# Patient Record
Sex: FEMALE | Race: WHITE | NOT HISPANIC OR LATINO | URBAN - METROPOLITAN AREA
[De-identification: names, ages, dates, MRNs, and addresses within clinical notes are randomized per-mention and may not be internally consistent; named-entity substitution may affect disease eponyms.]

---

## 2017-08-15 ENCOUNTER — COMMUNICATION - HEALTHEAST (OUTPATIENT)
Dept: CARDIOLOGY | Facility: CLINIC | Age: 77
End: 2017-08-15

## 2017-08-15 ENCOUNTER — OFFICE VISIT - HEALTHEAST (OUTPATIENT)
Dept: CARDIOLOGY | Facility: CLINIC | Age: 77
End: 2017-08-15

## 2017-08-15 DIAGNOSIS — R00.2 HEART PALPITATIONS: ICD-10-CM

## 2017-08-15 DIAGNOSIS — I10 ESSENTIAL HYPERTENSION: ICD-10-CM

## 2017-08-15 DIAGNOSIS — R06.09 DYSPNEA ON EXERTION: ICD-10-CM

## 2017-08-15 DIAGNOSIS — E78.2 MIXED HYPERLIPIDEMIA: ICD-10-CM

## 2017-08-15 ASSESSMENT — MIFFLIN-ST. JEOR: SCORE: 1395.06

## 2017-08-16 ENCOUNTER — COMMUNICATION - HEALTHEAST (OUTPATIENT)
Dept: INTERNAL MEDICINE | Facility: CLINIC | Age: 77
End: 2017-08-16

## 2017-08-18 ENCOUNTER — OFFICE VISIT - HEALTHEAST (OUTPATIENT)
Dept: INTERNAL MEDICINE | Facility: CLINIC | Age: 77
End: 2017-08-18

## 2017-08-18 ENCOUNTER — COMMUNICATION - HEALTHEAST (OUTPATIENT)
Dept: INTERNAL MEDICINE | Facility: CLINIC | Age: 77
End: 2017-08-18

## 2017-08-18 DIAGNOSIS — E78.5 HYPERLIPEMIA: ICD-10-CM

## 2017-08-18 DIAGNOSIS — G45.9 TIA (TRANSIENT ISCHEMIC ATTACK): ICD-10-CM

## 2017-08-18 LAB
CHOLEST SERPL-MCNC: 133 MG/DL
FASTING STATUS PATIENT QL REPORTED: YES
HDLC SERPL-MCNC: 46 MG/DL
LDLC SERPL CALC-MCNC: 70 MG/DL
TRIGL SERPL-MCNC: 84 MG/DL

## 2017-08-18 ASSESSMENT — MIFFLIN-ST. JEOR: SCORE: 1397.55

## 2017-08-23 ENCOUNTER — HOSPITAL ENCOUNTER (OUTPATIENT)
Dept: CARDIOLOGY | Facility: CLINIC | Age: 77
Discharge: HOME | End: 2017-08-23
Attending: INTERNAL MEDICINE

## 2017-08-23 ENCOUNTER — HOSPITAL ENCOUNTER (OUTPATIENT)
Dept: ULTRASOUND IMAGING | Facility: CLINIC | Age: 77
Discharge: HOME | End: 2017-08-23
Attending: INTERNAL MEDICINE

## 2017-08-23 ENCOUNTER — COMMUNICATION - HEALTHEAST (OUTPATIENT)
Dept: INTERNAL MEDICINE | Facility: CLINIC | Age: 77
End: 2017-08-23

## 2017-08-23 ENCOUNTER — HOSPITAL ENCOUNTER (OUTPATIENT)
Dept: NUCLEAR MEDICINE | Facility: CLINIC | Age: 77
Discharge: HOME | End: 2017-08-23
Attending: INTERNAL MEDICINE

## 2017-08-23 DIAGNOSIS — R47.89 SPELLS OF SPEECH ARREST: ICD-10-CM

## 2017-08-23 DIAGNOSIS — R06.09 OTHER FORMS OF DYSPNEA: ICD-10-CM

## 2017-08-23 DIAGNOSIS — G45.9 TIA (TRANSIENT ISCHEMIC ATTACK): ICD-10-CM

## 2017-08-23 DIAGNOSIS — R06.09 DYSPNEA ON EXERTION: ICD-10-CM

## 2017-08-23 LAB
CV STRESS CURRENT BP HE: NORMAL
CV STRESS CURRENT HR HE: 100
CV STRESS CURRENT HR HE: 103
CV STRESS CURRENT HR HE: 104
CV STRESS CURRENT HR HE: 107
CV STRESS CURRENT HR HE: 108
CV STRESS CURRENT HR HE: 109
CV STRESS CURRENT HR HE: 110
CV STRESS CURRENT HR HE: 110
CV STRESS CURRENT HR HE: 112
CV STRESS CURRENT HR HE: 113
CV STRESS CURRENT HR HE: 114
CV STRESS CURRENT HR HE: 115
CV STRESS CURRENT HR HE: 115
CV STRESS CURRENT HR HE: 88
CV STRESS CURRENT HR HE: 93
CV STRESS CURRENT HR HE: 93
CV STRESS DEVIATION TIME HE: NORMAL
CV STRESS ECHO PERCENT HR HE: NORMAL
CV STRESS EXERCISE STAGE HE: NORMAL
CV STRESS FINAL RESTING BP HE: NORMAL
CV STRESS FINAL RESTING HR HE: 93
CV STRESS MAX HR HE: 116
CV STRESS MAX TREADMILL GRADE HE: 0
CV STRESS MAX TREADMILL SPEED HE: 1
CV STRESS PEAK DIA BP HE: NORMAL
CV STRESS PEAK SYS BP HE: NORMAL
CV STRESS PHASE HE: NORMAL
CV STRESS PROTOCOL HE: NORMAL
CV STRESS RESTING PT POSITION HE: NORMAL
CV STRESS RESTING PT POSITION HE: NORMAL
CV STRESS ST DEVIATION AMOUNT HE: NORMAL
CV STRESS ST DEVIATION ELEVATION HE: NORMAL
CV STRESS ST EVELATION AMOUNT HE: NORMAL
CV STRESS TEST TYPE HE: NORMAL
CV STRESS TOTAL STAGE TIME MIN 1 HE: NORMAL
NUC STRESS EJECTION FRACTION: 63 %
STRESS ECHO BASELINE BP: NORMAL
STRESS ECHO BASELINE HR: 98
STRESS ECHO CALCULATED PERCENT HR: 82 %
STRESS ECHO LAST STRESS BP: NORMAL
STRESS ECHO LAST STRESS HR: 112

## 2017-08-24 ENCOUNTER — COMMUNICATION - HEALTHEAST (OUTPATIENT)
Dept: ADMINISTRATIVE | Facility: CLINIC | Age: 77
End: 2017-08-24

## 2017-09-18 ENCOUNTER — OFFICE VISIT - HEALTHEAST (OUTPATIENT)
Dept: CARDIOLOGY | Facility: CLINIC | Age: 77
End: 2017-09-18

## 2017-09-18 DIAGNOSIS — I10 ESSENTIAL HYPERTENSION WITH GOAL BLOOD PRESSURE LESS THAN 140/90: ICD-10-CM

## 2017-09-18 DIAGNOSIS — E78.2 MIXED HYPERLIPIDEMIA: ICD-10-CM

## 2017-09-18 DIAGNOSIS — R00.2 PALPITATIONS: ICD-10-CM

## 2017-09-18 ASSESSMENT — MIFFLIN-ST. JEOR: SCORE: 1382.13

## 2017-10-23 ENCOUNTER — RECORDS - HEALTHEAST (OUTPATIENT)
Dept: ADMINISTRATIVE | Facility: OTHER | Age: 77
End: 2017-10-23

## 2017-10-24 ENCOUNTER — RECORDS - HEALTHEAST (OUTPATIENT)
Dept: ADMINISTRATIVE | Facility: OTHER | Age: 77
End: 2017-10-24

## 2017-11-24 ENCOUNTER — HOSPITAL ENCOUNTER (OUTPATIENT)
Dept: NEUROLOGY | Facility: HOSPITAL | Age: 77
Discharge: HOME | End: 2017-11-24
Attending: PSYCHIATRY & NEUROLOGY

## 2017-11-24 DIAGNOSIS — R47.01 APHASIA: ICD-10-CM

## 2017-11-24 DIAGNOSIS — G43.809 MIGRAINE VARIANT: ICD-10-CM

## 2017-12-05 ENCOUNTER — COMMUNICATION - HEALTHEAST (OUTPATIENT)
Dept: INTERNAL MEDICINE | Facility: CLINIC | Age: 77
End: 2017-12-05

## 2018-01-03 ENCOUNTER — RECORDS - HEALTHEAST (OUTPATIENT)
Dept: ADMINISTRATIVE | Facility: OTHER | Age: 78
End: 2018-01-03

## 2018-01-15 ENCOUNTER — COMMUNICATION - HEALTHEAST (OUTPATIENT)
Dept: INTERNAL MEDICINE | Facility: CLINIC | Age: 78
End: 2018-01-15

## 2018-01-15 DIAGNOSIS — Z00.00 HEALTH CARE MAINTENANCE: ICD-10-CM

## 2018-02-05 ENCOUNTER — COMMUNICATION - HEALTHEAST (OUTPATIENT)
Dept: INTERNAL MEDICINE | Facility: CLINIC | Age: 78
End: 2018-02-05

## 2018-03-25 ENCOUNTER — COMMUNICATION - HEALTHEAST (OUTPATIENT)
Dept: CARDIOLOGY | Facility: CLINIC | Age: 78
End: 2018-03-25

## 2018-07-16 ENCOUNTER — COMMUNICATION - HEALTHEAST (OUTPATIENT)
Dept: LAB | Facility: CLINIC | Age: 78
End: 2018-07-16

## 2018-07-16 DIAGNOSIS — E78.2 MIXED HYPERLIPIDEMIA: ICD-10-CM

## 2018-07-16 DIAGNOSIS — E55.9 VITAMIN D DEFICIENCY: ICD-10-CM

## 2018-07-17 ENCOUNTER — AMBULATORY - HEALTHEAST (OUTPATIENT)
Dept: INTERNAL MEDICINE | Facility: CLINIC | Age: 78
End: 2018-07-17

## 2018-07-17 DIAGNOSIS — E78.5 HYPERLIPEMIA: ICD-10-CM

## 2018-07-23 ENCOUNTER — AMBULATORY - HEALTHEAST (OUTPATIENT)
Dept: LAB | Facility: CLINIC | Age: 78
End: 2018-07-23

## 2018-07-23 DIAGNOSIS — E78.5 HYPERLIPEMIA: ICD-10-CM

## 2018-07-23 DIAGNOSIS — E55.9 VITAMIN D DEFICIENCY: ICD-10-CM

## 2018-07-23 DIAGNOSIS — E78.2 MIXED HYPERLIPIDEMIA: ICD-10-CM

## 2018-07-23 LAB
25(OH)D3 SERPL-MCNC: 30.3 NG/ML (ref 30–80)
ALBUMIN SERPL-MCNC: 3.9 G/DL (ref 3.5–5)
ALBUMIN UR-MCNC: NEGATIVE MG/DL
ALP SERPL-CCNC: 94 U/L (ref 45–120)
ALT SERPL W P-5'-P-CCNC: 27 U/L (ref 0–45)
ANION GAP SERPL CALCULATED.3IONS-SCNC: 9 MMOL/L (ref 5–18)
APPEARANCE UR: CLEAR
AST SERPL W P-5'-P-CCNC: 22 U/L (ref 0–40)
BILIRUB SERPL-MCNC: 0.9 MG/DL (ref 0–1)
BILIRUB UR QL STRIP: NEGATIVE
BUN SERPL-MCNC: 15 MG/DL (ref 8–28)
CALCIUM SERPL-MCNC: 9.4 MG/DL (ref 8.5–10.5)
CHLORIDE BLD-SCNC: 106 MMOL/L (ref 98–107)
CHOLEST SERPL-MCNC: 138 MG/DL
CO2 SERPL-SCNC: 29 MMOL/L (ref 22–31)
COLOR UR AUTO: YELLOW
CREAT SERPL-MCNC: 0.71 MG/DL (ref 0.6–1.1)
ERYTHROCYTE [DISTWIDTH] IN BLOOD BY AUTOMATED COUNT: 10.9 % (ref 11–14.5)
FASTING STATUS PATIENT QL REPORTED: YES
GFR SERPL CREATININE-BSD FRML MDRD: >60 ML/MIN/1.73M2
GLUCOSE BLD-MCNC: 103 MG/DL (ref 70–125)
GLUCOSE UR STRIP-MCNC: NEGATIVE MG/DL
HCT VFR BLD AUTO: 43.7 % (ref 35–47)
HDLC SERPL-MCNC: 53 MG/DL
HGB BLD-MCNC: 15 G/DL (ref 12–16)
HGB UR QL STRIP: NEGATIVE
KETONES UR STRIP-MCNC: NEGATIVE MG/DL
LDLC SERPL CALC-MCNC: 67 MG/DL
LEUKOCYTE ESTERASE UR QL STRIP: ABNORMAL
MCH RBC QN AUTO: 31.7 PG (ref 27–34)
MCHC RBC AUTO-ENTMCNC: 34.2 G/DL (ref 32–36)
MCV RBC AUTO: 93 FL (ref 80–100)
NITRATE UR QL: NEGATIVE
PH UR STRIP: 6 [PH] (ref 5–8)
PLATELET # BLD AUTO: 202 THOU/UL (ref 140–440)
PMV BLD AUTO: 6.9 FL (ref 7–10)
POTASSIUM BLD-SCNC: 3.9 MMOL/L (ref 3.5–5)
PROT SERPL-MCNC: 6.6 G/DL (ref 6–8)
RBC # BLD AUTO: 4.72 MILL/UL (ref 3.8–5.4)
SODIUM SERPL-SCNC: 144 MMOL/L (ref 136–145)
SP GR UR STRIP: 1.01 (ref 1–1.03)
TRIGL SERPL-MCNC: 91 MG/DL
TSH SERPL DL<=0.005 MIU/L-ACNC: 0.14 UIU/ML (ref 0.3–5)
UROBILINOGEN UR STRIP-ACNC: ABNORMAL
WBC: 4.5 THOU/UL (ref 4–11)

## 2018-07-24 LAB — BACTERIA SPEC CULT: NO GROWTH

## 2018-07-26 ENCOUNTER — COMMUNICATION - HEALTHEAST (OUTPATIENT)
Dept: ADMINISTRATIVE | Facility: CLINIC | Age: 78
End: 2018-07-26

## 2018-07-27 ENCOUNTER — OFFICE VISIT - HEALTHEAST (OUTPATIENT)
Dept: INTERNAL MEDICINE | Facility: CLINIC | Age: 78
End: 2018-07-27

## 2018-07-27 DIAGNOSIS — L98.9 SKIN LESION: ICD-10-CM

## 2018-07-27 DIAGNOSIS — G45.9 TIA (TRANSIENT ISCHEMIC ATTACK): ICD-10-CM

## 2018-07-27 DIAGNOSIS — E78.5 HYPERLIPEMIA: ICD-10-CM

## 2018-07-27 DIAGNOSIS — I10 ESSENTIAL HYPERTENSION: ICD-10-CM

## 2018-07-27 DIAGNOSIS — R00.2 HEART PALPITATIONS: ICD-10-CM

## 2018-09-13 ENCOUNTER — COMMUNICATION - HEALTHEAST (OUTPATIENT)
Dept: ADMINISTRATIVE | Facility: CLINIC | Age: 78
End: 2018-09-13

## 2018-11-07 ENCOUNTER — COMMUNICATION - HEALTHEAST (OUTPATIENT)
Dept: INTERNAL MEDICINE | Facility: CLINIC | Age: 78
End: 2018-11-07

## 2018-11-20 ENCOUNTER — COMMUNICATION - HEALTHEAST (OUTPATIENT)
Dept: INTERNAL MEDICINE | Facility: CLINIC | Age: 78
End: 2018-11-20

## 2019-01-22 ENCOUNTER — COMMUNICATION - HEALTHEAST (OUTPATIENT)
Dept: INTERNAL MEDICINE | Facility: CLINIC | Age: 79
End: 2019-01-22

## 2019-01-22 DIAGNOSIS — E03.9 HYPOTHYROIDISM: ICD-10-CM

## 2019-01-22 DIAGNOSIS — G45.9 TIA (TRANSIENT ISCHEMIC ATTACK): ICD-10-CM

## 2019-01-22 DIAGNOSIS — E78.2 MIXED HYPERLIPIDEMIA: ICD-10-CM

## 2019-05-13 ENCOUNTER — COMMUNICATION - HEALTHEAST (OUTPATIENT)
Dept: INTERNAL MEDICINE | Facility: CLINIC | Age: 79
End: 2019-05-13

## 2019-06-10 ENCOUNTER — OFFICE VISIT - HEALTHEAST (OUTPATIENT)
Dept: INTERNAL MEDICINE | Facility: CLINIC | Age: 79
End: 2019-06-10

## 2019-06-10 DIAGNOSIS — Z00.00 ROUTINE GENERAL MEDICAL EXAMINATION AT A HEALTH CARE FACILITY: ICD-10-CM

## 2019-06-10 DIAGNOSIS — Z13.220 ENCOUNTER FOR SCREENING FOR LIPOID DISORDERS: ICD-10-CM

## 2019-06-10 DIAGNOSIS — E03.9 HYPOTHYROIDISM, UNSPECIFIED TYPE: ICD-10-CM

## 2019-06-10 DIAGNOSIS — I10 ESSENTIAL HYPERTENSION: ICD-10-CM

## 2019-06-10 DIAGNOSIS — Z13.1 ENCOUNTER FOR SCREENING FOR DIABETES MELLITUS: ICD-10-CM

## 2019-06-10 DIAGNOSIS — M89.9 DISORDER OF BONE AND CARTILAGE: ICD-10-CM

## 2019-06-10 DIAGNOSIS — M94.9 DISORDER OF BONE AND CARTILAGE: ICD-10-CM

## 2019-06-10 LAB
ANION GAP SERPL CALCULATED.3IONS-SCNC: 9 MMOL/L (ref 5–18)
BUN SERPL-MCNC: 13 MG/DL (ref 8–28)
CALCIUM SERPL-MCNC: 9.7 MG/DL (ref 8.5–10.5)
CHLORIDE BLD-SCNC: 104 MMOL/L (ref 98–107)
CHOLEST SERPL-MCNC: 122 MG/DL
CO2 SERPL-SCNC: 28 MMOL/L (ref 22–31)
CREAT SERPL-MCNC: 0.68 MG/DL (ref 0.6–1.1)
FASTING STATUS PATIENT QL REPORTED: ABNORMAL
GFR SERPL CREATININE-BSD FRML MDRD: >60 ML/MIN/1.73M2
GLUCOSE BLD-MCNC: 106 MG/DL (ref 70–125)
HDLC SERPL-MCNC: 41 MG/DL
LDLC SERPL CALC-MCNC: 61 MG/DL
POTASSIUM BLD-SCNC: 3.9 MMOL/L (ref 3.5–5)
SODIUM SERPL-SCNC: 141 MMOL/L (ref 136–145)
TRIGL SERPL-MCNC: 98 MG/DL
TSH SERPL DL<=0.005 MIU/L-ACNC: 0.03 UIU/ML (ref 0.3–5)

## 2019-06-10 ASSESSMENT — MIFFLIN-ST. JEOR: SCORE: 1304.11

## 2019-06-11 LAB — 25(OH)D3 SERPL-MCNC: 33.3 NG/ML (ref 30–80)

## 2019-06-12 ENCOUNTER — COMMUNICATION - HEALTHEAST (OUTPATIENT)
Dept: INTERNAL MEDICINE | Facility: CLINIC | Age: 79
End: 2019-06-12

## 2019-06-12 ENCOUNTER — AMBULATORY - HEALTHEAST (OUTPATIENT)
Dept: INTERNAL MEDICINE | Facility: CLINIC | Age: 79
End: 2019-06-12

## 2019-06-12 DIAGNOSIS — E03.9 HYPOTHYROIDISM: ICD-10-CM

## 2019-07-16 ENCOUNTER — COMMUNICATION - HEALTHEAST (OUTPATIENT)
Dept: INTERNAL MEDICINE | Facility: CLINIC | Age: 79
End: 2019-07-16

## 2019-07-16 DIAGNOSIS — E78.2 MIXED HYPERLIPIDEMIA: ICD-10-CM

## 2019-07-30 ENCOUNTER — COMMUNICATION - HEALTHEAST (OUTPATIENT)
Dept: INTERNAL MEDICINE | Facility: CLINIC | Age: 79
End: 2019-07-30

## 2019-07-30 DIAGNOSIS — E03.9 HYPOTHYROIDISM: ICD-10-CM

## 2019-10-02 ENCOUNTER — COMMUNICATION - HEALTHEAST (OUTPATIENT)
Dept: INTERNAL MEDICINE | Facility: CLINIC | Age: 79
End: 2019-10-02

## 2019-10-02 DIAGNOSIS — G45.9 TIA (TRANSIENT ISCHEMIC ATTACK): ICD-10-CM

## 2019-10-03 RX ORDER — LOSARTAN POTASSIUM 100 MG/1
TABLET ORAL
Qty: 90 TABLET | Refills: 2 | Status: SHIPPED | OUTPATIENT
Start: 2019-10-03

## 2019-10-03 RX ORDER — HYDROCHLOROTHIAZIDE 12.5 MG/1
TABLET ORAL
Qty: 90 TABLET | Refills: 2 | Status: SHIPPED | OUTPATIENT
Start: 2019-10-03

## 2020-05-05 ENCOUNTER — COMMUNICATION - HEALTHEAST (OUTPATIENT)
Dept: INTERNAL MEDICINE | Facility: CLINIC | Age: 80
End: 2020-05-05

## 2020-05-05 DIAGNOSIS — E03.9 HYPOTHYROIDISM: ICD-10-CM

## 2020-05-07 RX ORDER — LEVOTHYROXINE SODIUM 125 UG/1
TABLET ORAL
Qty: 90 TABLET | Refills: 0 | Status: SHIPPED | OUTPATIENT
Start: 2020-05-07

## 2020-05-21 ENCOUNTER — COMMUNICATION - HEALTHEAST (OUTPATIENT)
Dept: INTERNAL MEDICINE | Facility: CLINIC | Age: 80
End: 2020-05-21

## 2020-05-21 DIAGNOSIS — E78.2 MIXED HYPERLIPIDEMIA: ICD-10-CM

## 2020-05-26 RX ORDER — ATORVASTATIN CALCIUM 20 MG/1
TABLET, FILM COATED ORAL
Qty: 90 TABLET | Refills: 0 | Status: SHIPPED | OUTPATIENT
Start: 2020-05-26

## 2020-06-27 ENCOUNTER — COMMUNICATION - HEALTHEAST (OUTPATIENT)
Dept: INTERNAL MEDICINE | Facility: CLINIC | Age: 80
End: 2020-06-27

## 2020-06-27 DIAGNOSIS — E03.9 HYPOTHYROIDISM: ICD-10-CM

## 2021-05-25 ENCOUNTER — RECORDS - HEALTHEAST (OUTPATIENT)
Dept: ADMINISTRATIVE | Facility: CLINIC | Age: 81
End: 2021-05-25

## 2021-05-26 ENCOUNTER — RECORDS - HEALTHEAST (OUTPATIENT)
Dept: ADMINISTRATIVE | Facility: CLINIC | Age: 81
End: 2021-05-26

## 2021-05-29 NOTE — TELEPHONE ENCOUNTER
----- Message from Renata Easley MD sent at 6/12/2019 11:06 AM CDT -----  Please call patient and let her know:    1) Her vitamin D level is great. Continue vitamin D as she is taking.  2) Her electrolytes and kidney function are great.  3) Fasting glucose is great at 106.  4) Her total cholesterol is 122 and bad cholesterol is 61, which is great. Her good cholesterol HDL is low at 41. Increasing physical activity will help bring that number up.  5) Her thyroid testing shows that her dose of synthroid is a little too high. I want her to decrease her synthroid from 150mcg to 125 mcg daily and repeat the TSH test in 4-6 weeks (she can just go to lab for lab only visit). I will send new Rx for synthroid.    Dr. Lopez

## 2021-05-29 NOTE — PROGRESS NOTES
Assessment and Plan:       1. Routine general medical examination at a health care facility  --She wishes to defer TD booster for now and get it next fall with influenza vaccine  --Discussed Shingrix with patient. She can go to [facility] pharmacy to inquire about whether they are still offering.    2. Encounter for screening for diabetes mellitus  --fasting glucose    3. Encounter for screening for lipoid disorders  - Lipid Cavalier, FASTING; Future  - Lipid Cavalier, FASTING  - continue atorvastatin    4. Essential hypertension, well controlled  - Basic Metabolic Panel  - continue HCTZ and losartan    5. Hypothyroidism, unspecified type  - Thyroid Stimulating Hormone (TSH)  - continue synthroid    6. Osteopenia  Takes vitamin D.  - Vitamin D, Total (25-Hydroxy)     The patient's current medical problems were reviewed.    I have had an Advance Directives discussion with the patient.  The following health maintenance schedule was reviewed with the patient and provided in printed form in the after visit summary:   Health Maintenance   Topic Date Due     TD 18+ HE  12/17/1958     PNEUMOCOCCAL CONJUGATE VACCINE FOR ADULTS (PCV13 OR PREVNAR)  12/17/2003     ZOSTER VACCINES (2 of 3) 03/12/2007     FALL RISK ASSESSMENT  07/27/2019     INFLUENZA VACCINE RULE BASED (Season Ended) 08/01/2019     ADVANCE DIRECTIVES DISCUSSED WITH PATIENT  08/18/2022     PNEUMOCOCCAL POLYSACCHARIDE VACCINE AGE 65 AND OVER  Completed     DXA SCAN  Discontinued        Subjective:   Chief Complaint: Shira Hubbard is an 78 y.o. female here for an Annual Wellness visit.     HPI:      1. Hypothyroidism  Taking her synthroid everyday.    2. HLD  Takes her atorvastatin daily.    3. HTN  Takes losartan and HCTZ. Tolerates well.     4. Macular degeneration  Diagnosed last week with macular degeneration.    5. Chronic back pain  She just wants to let me know she has chronic back pain. Uses bands to help stretch her back.    6. Advanced  directive  Desires no CPR/no intubation    Review of Systems:  Please see above.  The rest of the review of systems are negative for all systems.    Patient Care Team:  Renata Easley MD as PCP - General (Internal Medicine)     Patient Active Problem List   Diagnosis     Irritable Bowel Syndrome     Hypothyroidism     Mixed hyperlipidemia     Essential Hypertension     Collagenous Colitis     Osteopenia     Heart palpitations     TIA (transient ischemic attack)     Past Medical History:   Diagnosis Date     Cancer (H) 1974    Breast cancer bilateral mastectomies     Cerebral vascular accident (H)      Family history of myocardial infarction      Hypertension       Past Surgical History:   Procedure Laterality Date     BREAST SURGERY Bilateral 1974    Bilateral mastectomy     ND MASTECTOMY, RADICAL Bilateral 1974    Remote breast cancer     TENDON REPAIR Left 1990      Family History   Problem Relation Age of Onset     Coronary artery disease Mother      Sudden death Mother      Coronary artery disease Father      CABG Father      Coronary artery disease Brother      CABG Brother       Social History     Socioeconomic History     Marital status:      Spouse name: Eric     Number of children: Not on file     Years of education: Not on file     Highest education level: Not on file   Occupational History     Occupation: Retired   Social Needs     Financial resource strain: Not on file     Food insecurity:     Worry: Not on file     Inability: Not on file     Transportation needs:     Medical: Not on file     Non-medical: Not on file   Tobacco Use     Smoking status: Former Smoker     Types: Cigarettes     Smokeless tobacco: Former User   Substance and Sexual Activity     Alcohol use: Not on file     Drug use: Not on file     Sexual activity: Not on file   Lifestyle     Physical activity:     Days per week: Not on file     Minutes per session: Not on file     Stress: Not on file   Relationships      "Social connections:     Talks on phone: Not on file     Gets together: Not on file     Attends Judaism service: Not on file     Active member of club or organization: Not on file     Attends meetings of clubs or organizations: Not on file     Relationship status: Not on file     Intimate partner violence:     Fear of current or ex partner: Not on file     Emotionally abused: Not on file     Physically abused: Not on file     Forced sexual activity: Not on file   Other Topics Concern     Not on file   Social History Narrative     50+ years 3 grown children to live in [place] area son lives in [place].  Her  Eric is a retired [employer] executive.  The spend winter months in [place] and [place] in [place].  Non-smoker; no alcohol issues.      Current Outpatient Medications   Medication Sig Dispense Refill     aspirin 81 MG EC tablet Take 81 mg by mouth at bedtime.       atorvastatin (LIPITOR) 20 MG tablet TAKE 1 TABLET AT BEDTIME 90 tablet 1     cholecalciferol, vitamin D3, (VITAMIN D3) 2,000 unit Tab Take 2,000 Units by mouth daily.       hydroCHLOROthiazide (HYDRODIURIL) 12.5 MG tablet TAKE 1 TABLET EVERY DAY 90 tablet 1     levothyroxine (SYNTHROID, LEVOTHROID) 150 MCG tablet TAKE 1 TABLET EVERY DAY 90 tablet 1     losartan (COZAAR) 100 MG tablet One tablet (100 mg) daily for BP. 90 tablet 3     pantoprazole (PROTONIX) 20 MG tablet Take 20 mg by mouth 2 (two) times a day.       No current facility-administered medications for this visit.       Objective:   Vital Signs:   Visit Vitals  /60 (Patient Site: Right Arm, Patient Position: Sitting, Cuff Size: Adult Regular)   Pulse 64   Resp 16   Ht 5' 4\" (1.626 m)   Wt 189 lb 6.4 oz (85.9 kg)   SpO2 97%   BMI 32.51 kg/m         VisionScreening:  No exam data present     PHYSICAL EXAM  /60 (Patient Site: Right Arm, Patient Position: Sitting, Cuff Size: Adult Regular)   Pulse 64   " "Resp 16   Ht 5' 4\" (1.626 m)   Wt 189 lb 6.4 oz (85.9 kg)   SpO2 97%   BMI 32.51 kg/m      General Appearance:    Alert, cooperative, no distress, appears stated age   Head:    Normocephalic, without obvious abnormality, atraumatic   Eyes:    PERRL, conjunctiva/corneas clear, EOM's intact   Ears:    Normal TM's and external ear canals, both ears   Nose:   Nares normal, septum midline, mucosa normal   Throat:   Lips, mucosa, and tongue normal; teeth and gums normal   Neck:   Supple, symmetrical, trachea midline, no adenopathy   Lungs:     Clear to auscultation bilaterally, respirations unlabored    Heart:    Regular rate and rhythm, S1 and S2 normal, no murmur, rub    or gallop   Breast Exam:    Deferred   Abdomen:     Soft, non-tender, bowel sounds active   Extremities:   Extremities normal, atraumatic, no cyanosis or edema   Pulses:   2+ and symmetric all extremities   Skin:   Skin color, texture, turgor normal, no rashes or lesions   Neurologic:   Normal strength and tone throughout, gait normal       Assessment Results 6/10/2019   Activities of Daily Living No help needed   Instrumental Activities of Daily Living No help needed   Get Up and Go Score Less than 12 seconds   Mini Cog Total Score 5   Some recent data might be hidden     A Mini-Cog score of 0-2 suggests the possibility of dementia, score of 3-5 suggests no dementia    Identified Health Risks:     She is at risk for lack of exercise and has been provided with information to increase physical activity for the benefit of her well-being.  The patient reports that she does not have all recommended working emergency equipment available. She was provided with information about emergency preparedness, including smoke detectors.  Information on urinary incontinence and treatment options given to patient.  She is at risk for falling and has been provided with information to reduce the risk of falling at home.  Patient's advanced directive was discussed and " I am comfortable with the patient's wishes.

## 2021-05-29 NOTE — TELEPHONE ENCOUNTER
Message is left for patient to phone clinic for lab results. Please relay the followin) Her vitamin D level is great. Continue vitamin D as she is taking.   2) Her electrolytes and kidney function are great.   3) Fasting glucose is great at 106.   4) Her total cholesterol is 122 and bad cholesterol is 61, which is great. Her good cholesterol HDL is low at 41. Increasing physical activity will help bring that number up.   5) Her thyroid testing shows that her dose of synthroid is a little too high. I want her to decrease her synthroid from 150mcg to 125 mcg daily and repeat the TSH test in 4-6 weeks (she can just go to lab for lab only visit). I will send new Rx for synthroid.     Dr. Lopez

## 2021-05-30 NOTE — TELEPHONE ENCOUNTER
Refill Approved    Rx renewed per Medication Renewal Policy. Medication was last renewed on 1/24/19.    Lois Walton, [facility] Triage/Med Refill 7/16/2019     Requested Prescriptions   Pending Prescriptions Disp Refills     atorvastatin (LIPITOR) 20 MG tablet [Pharmacy Med Name: ATORVASTATIN CALCIUM 20 MG Tablet] 90 tablet 1     Sig: TAKE 1 TABLET AT BEDTIME       Statins Refill Protocol (Hmg CoA Reductase Inhibitors) Passed - 7/16/2019  3:24 PM        Passed - PCP or prescribing provider visit in past 12 months      Last office visit with prescriber/PCP: 7/27/2018 Jan Blackburn MD OR same dept: 7/27/2018 Jan Blackburn MD OR same specialty: 7/27/2018 Jan Blackburn MD  Last physical: Visit date not found Last MTM visit: Visit date not found   Next visit within 3 mo: Visit date not found  Next physical within 3 mo: Visit date not found  Prescriber OR PCP: Jan Blackburn MD  Last diagnosis associated with med order: 1. Mixed hyperlipidemia  - atorvastatin (LIPITOR) 20 MG tablet [Pharmacy Med Name: ATORVASTATIN CALCIUM 20 MG Tablet]; TAKE 1 TABLET AT BEDTIME  Dispense: 90 tablet; Refill: 1    If protocol passes may refill for 12 months if within 3 months of last provider visit (or a total of 15 months).

## 2021-05-30 NOTE — TELEPHONE ENCOUNTER
Refill Approved    Rx renewed per Medication Renewal Policy. Medication was last renewed on 6/12/19.    Lois Walton, [facility] Connection Triage/Med Refill 7/30/2019     Requested Prescriptions   Pending Prescriptions Disp Refills     levothyroxine (SYNTHROID, LEVOTHROID) 125 MCG tablet [Pharmacy Med Name: LEVOTHYROXINE SODIUM 125 MCG Tablet] 60 tablet 1     Sig: TAKE 1 TABLET EVERY DAY       Thyroid Hormones Protocol Passed - 7/30/2019  1:46 PM        Passed - Provider visit in past 12 months or next 3 months     Last office visit with prescriber/PCP: Visit date not found OR same dept: Visit date not found OR same specialty: 7/27/2018 Jan Blackburn MD  Last physical: 6/10/2019 Last MTM visit: Visit date not found   Next visit within 3 mo: Visit date not found  Next physical within 3 mo: Visit date not found  Prescriber OR PCP: Renata Easley MD  Last diagnosis associated with med order: 1. Hypothyroidism  - levothyroxine (SYNTHROID, LEVOTHROID) 125 MCG tablet [Pharmacy Med Name: LEVOTHYROXINE SODIUM 125 MCG Tablet]; TAKE 1 TABLET EVERY DAY  Dispense: 60 tablet; Refill: 1    If protocol passes may refill for 12 months if within 3 months of last provider visit (or a total of 15 months).             Passed - TSH on file in past 12 months for patient age 12 & older     TSH   Date Value Ref Range Status   06/10/2019 0.03 (L) 0.30 - 5.00 uIU/mL Final

## 2021-05-31 VITALS — WEIGHT: 206.6 LBS | BODY MASS INDEX: 35.27 KG/M2 | HEIGHT: 64 IN

## 2021-05-31 VITALS — HEIGHT: 65 IN | BODY MASS INDEX: 34.6 KG/M2 | WEIGHT: 207.7 LBS

## 2021-05-31 VITALS — WEIGHT: 210 LBS | BODY MASS INDEX: 35.85 KG/M2 | HEIGHT: 64 IN

## 2021-06-01 VITALS — WEIGHT: 204.4 LBS | BODY MASS INDEX: 35.09 KG/M2

## 2021-06-02 ENCOUNTER — RECORDS - HEALTHEAST (OUTPATIENT)
Dept: ADMINISTRATIVE | Facility: CLINIC | Age: 81
End: 2021-06-02

## 2021-06-02 NOTE — TELEPHONE ENCOUNTER
Refill Approved    Rx renewed per Medication Renewal Policy. Medication was last renewed on 1/24/19.11/20/18    Lois Walton, [facility] Connection Triage/Med Refill 10/3/2019     Requested Prescriptions   Pending Prescriptions Disp Refills     hydroCHLOROthiazide (HYDRODIURIL) 12.5 MG tablet [Pharmacy Med Name: HYDROCHLOROTHIAZIDE 12.5 MG Tablet] 90 tablet 1     Sig: TAKE 1 TABLET EVERY DAY       Diuretics/Combination Diuretics Refill Protocol  Passed - 10/2/2019  5:29 PM        Passed - Visit with PCP or prescribing provider visit in past 12 months     Last office visit with prescriber/PCP: 7/27/2018 Jan Blackburn MD OR same dept: Visit date not found OR same specialty: 7/27/2018 Jan Blackburn MD  Last physical: Visit date not found Last MTM visit: Visit date not found   Next visit within 3 mo: Visit date not found  Next physical within 3 mo: Visit date not found  Prescriber OR PCP: Jan Blackburn MD  Last diagnosis associated with med order: 1. TIA (transient ischemic attack)  - hydroCHLOROthiazide (HYDRODIURIL) 12.5 MG tablet [Pharmacy Med Name: HYDROCHLOROTHIAZIDE 12.5 MG Tablet]; TAKE 1 TABLET EVERY DAY  Dispense: 90 tablet; Refill: 1    If protocol passes may refill for 12 months if within 3 months of last provider visit (or a total of 15 months).             Passed - Serum Potassium in past 12 months      Lab Results   Component Value Date    Potassium 3.9 06/10/2019             Passed - Serum Sodium in past 12 months      Lab Results   Component Value Date    Sodium 141 06/10/2019             Passed - Blood pressure on file in past 12 months     BP Readings from Last 1 Encounters:   06/10/19 118/60             Passed - Serum Creatinine in past 12 months      Creatinine   Date Value Ref Range Status   06/10/2019 0.68 0.60 - 1.10 mg/dL Final             losartan (COZAAR) 100 MG tablet [Pharmacy Med Name: LOSARTAN POTASSIUM 100 MG Tablet] 90 tablet 3     Sig: TAKE 1 TABLET EVERY DAY FOR BLOOD  PRESSURE       Angiotensin Receptor Blocker Protocol Passed - 10/2/2019  5:29 PM        Passed - PCP or prescribing provider visit in past 12 months       Last office visit with prescriber/PCP: 7/27/2018 Jan Blackburn MD OR same dept: Visit date not found OR same specialty: 7/27/2018 Jan Blackburn MD  Last physical: Visit date not found Last MTM visit: Visit date not found   Next visit within 3 mo: Visit date not found  Next physical within 3 mo: Visit date not found  Prescriber OR PCP: Jan Blackburn MD  Last diagnosis associated with med order: 1. TIA (transient ischemic attack)  - hydroCHLOROthiazide (HYDRODIURIL) 12.5 MG tablet [Pharmacy Med Name: HYDROCHLOROTHIAZIDE 12.5 MG Tablet]; TAKE 1 TABLET EVERY DAY  Dispense: 90 tablet; Refill: 1    If protocol passes may refill for 12 months if within 3 months of last provider visit (or a total of 15 months).             Passed - Serum potassium within the past 12 months     Lab Results   Component Value Date    Potassium 3.9 06/10/2019             Passed - Blood pressure filed in past 12 months     BP Readings from Last 1 Encounters:   06/10/19 118/60             Passed - Serum creatinine within the past 12 months     Creatinine   Date Value Ref Range Status   06/10/2019 0.68 0.60 - 1.10 mg/dL Final

## 2021-06-03 VITALS — HEIGHT: 64 IN | WEIGHT: 189.4 LBS | BODY MASS INDEX: 32.33 KG/M2

## 2021-06-08 NOTE — TELEPHONE ENCOUNTER
Refill Approved    Rx renewed per Medication Renewal Policy. Medication was last renewed on 7/30/19.    Lois Walton, [facility] Connection Triage/Med Refill 5/7/2020     Requested Prescriptions   Pending Prescriptions Disp Refills     levothyroxine (SYNTHROID, LEVOTHROID) 125 MCG tablet [Pharmacy Med Name: LEVOTHYROXINE SODIUM 125 MCG Tablet] 90 tablet 2     Sig: TAKE 1 TABLET EVERY DAY       Thyroid Hormones Protocol Passed - 5/5/2020  3:30 PM        Passed - Provider visit in past 12 months or next 3 months     Last office visit with prescriber/PCP: Visit date not found OR same dept: Visit date not found OR same specialty: 7/27/2018 Jan Blackburn MD  Last physical: 6/10/2019 Last MTM visit: Visit date not found   Next visit within 3 mo: Visit date not found  Next physical within 3 mo: Visit date not found  Prescriber OR PCP: Renata Easley MD  Last diagnosis associated with med order: 1. Hypothyroidism  - levothyroxine (SYNTHROID, LEVOTHROID) 125 MCG tablet [Pharmacy Med Name: LEVOTHYROXINE SODIUM 125 MCG Tablet]; TAKE 1 TABLET EVERY DAY  Dispense: 90 tablet; Refill: 2    If protocol passes may refill for 12 months if within 3 months of last provider visit (or a total of 15 months).             Passed - TSH on file in past 12 months for patient age 12 & older     TSH   Date Value Ref Range Status   06/10/2019 0.03 (L) 0.30 - 5.00 uIU/mL Final

## 2021-06-08 NOTE — TELEPHONE ENCOUNTER
Refill Approved    Rx renewed per Medication Renewal Policy. Medication was last renewed on 7/16/19.    Lois Walton, [facility] Connection Triage/Med Refill 5/26/2020     Requested Prescriptions   Pending Prescriptions Disp Refills     atorvastatin (LIPITOR) 20 MG tablet [Pharmacy Med Name: ATORVASTATIN CALCIUM 20 MG Tablet] 90 tablet 3     Sig: TAKE 1 TABLET AT BEDTIME       Statins Refill Protocol (Hmg CoA Reductase Inhibitors) Passed - 5/21/2020  6:19 PM        Passed - PCP or prescribing provider visit in past 12 months      Last office visit with prescriber/PCP: Visit date not found OR same dept: Visit date not found OR same specialty: 7/27/2018 Jan Blackburn MD  Last physical: 6/10/2019 Last MTM visit: Visit date not found   Next visit within 3 mo: Visit date not found  Next physical within 3 mo: Visit date not found  Prescriber OR PCP: Renata Easley MD  Last diagnosis associated with med order: 1. Mixed hyperlipidemia  - atorvastatin (LIPITOR) 20 MG tablet [Pharmacy Med Name: ATORVASTATIN CALCIUM 20 MG Tablet]; TAKE 1 TABLET AT BEDTIME  Dispense: 90 tablet; Refill: 3    If protocol passes may refill for 12 months if within 3 months of last provider visit (or a total of 15 months).

## 2021-06-09 NOTE — TELEPHONE ENCOUNTER
RN cannot approve Refill Request    RN can NOT refill this medication PCP messaged that patient is overdue for Labs and Office Visit. Last office visit: Visit date not found Last Physical: 6/10/2019 Last MTM visit: Visit date not found Last visit same specialty: 7/27/2018 Jan Blackburn MD.  Next visit within 3 mo: Visit date not found  Next physical within 3 mo: Visit date not found      Jazmin Valdivia, [facility] Triage/Med Refill 6/28/2020    Requested Prescriptions   Pending Prescriptions Disp Refills     levothyroxine (SYNTHROID, LEVOTHROID) 125 MCG tablet [Pharmacy Med Name: LEVOTHYROXINE SODIUM 125 MCG Tablet] 90 tablet 0     Sig: TAKE 1 TABLET EVERY DAY       Thyroid Hormones Protocol Failed - 6/27/2020  2:12 PM        Failed - Provider visit in past 12 months or next 3 months     Last office visit with prescriber/PCP: Visit date not found OR same dept: Visit date not found OR same specialty: 7/27/2018 Jan Blackburn MD  Last physical: 6/10/2019 Last MTM visit: Visit date not found   Next visit within 3 mo: Visit date not found  Next physical within 3 mo: Visit date not found  Prescriber OR PCP: Renata Easley MD  Last diagnosis associated with med order: 1. Hypothyroidism  - levothyroxine (SYNTHROID, LEVOTHROID) 125 MCG tablet [Pharmacy Med Name: LEVOTHYROXINE SODIUM 125 MCG Tablet]; TAKE 1 TABLET EVERY DAY  Dispense: 90 tablet; Refill: 0    If protocol passes may refill for 12 months if within 3 months of last provider visit (or a total of 15 months).             Failed - TSH on file in past 12 months for patient age 12 & older     TSH   Date Value Ref Range Status   06/10/2019 0.03 (L) 0.30 - 5.00 uIU/mL Final

## 2021-06-09 NOTE — TELEPHONE ENCOUNTER
Please call patient and let her know she is due for med check. Declining refill on synthroid until she has an appointment.

## 2021-06-16 PROBLEM — R00.2 HEART PALPITATIONS: Status: ACTIVE | Noted: 2017-08-15

## 2021-06-16 PROBLEM — G45.9 TIA (TRANSIENT ISCHEMIC ATTACK): Status: ACTIVE | Noted: 2017-08-18

## 2021-06-17 NOTE — PATIENT INSTRUCTIONS - HE
Patient Instructions by Renata Easley MD at 6/10/2019 10:00 AM     Author: Renata Easley MD Service: -- Author Type: Physician    Filed: 6/10/2019 10:04 AM Encounter Date: 6/10/2019 Status: Signed    : Renata Easley MD (Physician)         Patient Education     Exercise for a Healthier Heart  You may wonder how you can improve the health of your heart. If youre thinking about exercise, youre on the right track. You dont need to become an athlete, but you do need a certain amount of brisk exercise to help strengthen your heart. If you have been diagnosed with a heart condition, your doctor may recommend exercise to help stabilize your condition. To help make exercise a habit, choose safe, fun activities.       Be sure to check with your health care provider before starting an exercise program.    Why exercise?  Exercising regularly offers many healthy rewards. It can help you do all of the following:    Improve your blood cholesterol levels to help prevent further heart trouble    Lower your blood pressure to help prevent a stroke or heart attack    Control diabetes, or reduce your risk of getting this disease    Improve your heart and lung function    Reach and maintain a healthy weight    Make your muscles stronger and more limber so you can stay active    Prevent falls and fractures by slowing the loss of bone mass (osteoporosis)    Manage stress better  Exercise tips  Ease into your routine. Set small goals. Then build on them.  Exercise on most days. Aim for a total of 150 or more minutes of moderate to  vigorous intensity activity each week. Consider 40 minutes, 3 to 4 times a week. For best results, activity should last for 40 minutes on average. It is OK to work up to the 40 minute period over time. Examples of moderate-intensity activity is walking one mile in 15 minutes or 30 to 45 minutes of yard work.  Step up your daily activity level. Along with your  exercise program, try being more active throughout the day. Walk instead of drive. Do more household tasks or yard work.  Choose one or more activities you enjoy. Walking is one of the easiest things you can do. You can also try swimming, riding a bike, or taking an exercise class.  Stop exercising and call your doctor if you:    Have chest pain or feel dizzy or lightheaded    Feel burning, tightness, pressure, or heaviness in your chest, neck, shoulders, back, or arms    Have unusual shortness of breath    Have increased joint or muscle pain    Have palpitations or an irregular heartbeat      3224-9896 [employer]. [address] [place] [address]. All rights reserved. This information is not intended as a substitute for professional medical care. Always follow your healthcare professional's instructions.         Patient Education    Home Fire Safety  Each year, thousands of people, including children, are injured and killed in home fires. Children are often curious about fire, and may not understand the dangers. This makes home fire safety practices especially important. Three important things you can do to keep your home safe from fire are:    Install smoke alarms in your home and make sure they work properly.    Teach children not to play with matches, lighters, and other materials that can be used to start fires. And keep these materials out of childrens reach.    Teach children what to do in case of fire. Create a fire safety action plan and practice it.  Read on for more details about keeping your family and home safe from fire.        Being Prepared for a Fire  A home fire can happen at any time. The following can help you be prepared:    Install smoke alarms on every level of your home, including the basement and outside all sleeping areas. This simple step cuts your familys risk of dying in a fire nearly in half.    Test smoke alarms monthly, and change the batteries once a year or  when the alarm chirps.    Dont disable smoke alarms, even for a short time.    Ask your local fire department for tips on where to place smoke alarms in your home.    Replace all smoke alarms every 10 years.    Consider using voice smoke alarms. These alarms allow you to substitute your own voice for the alarm sound. They are helpful because many children dont wake up to the sound of a regular smoke alarm.    Install carbon monoxide detectors near sleeping areas.    Be aware that carbon monoxide is a byproduct of smoke that can be deadly. Its a gas that you cant see, smell, or taste.    Consider buying a combination smoke alarm / carbon monoxide detector.    Keep fire extinguishers in the home.    Keep them in accessible locations, especially in the kitchen.    Check usage dates to make sure they are not .    Use fire extinguishers only when the fire is in a contained area and is not spreading. (Otherwise, you should focus on getting out of the home.)    Train adults to use fire extinguishers. (Children should focus on getting out of the home during a fire.)    If you live in an apartment, talk to your landlord about where smoke alarms are and how often they are tested. Also ask about fire extinguisher locations and emergency exit routes.  Indoor Fire Safety  Many things in your home are potential fire hazards. Follow these steps to help keep your home safe.    Be careful in the kitchen.    Never leave food thats cooking unattended.    If a fire breaks out in a cooking pan, put a lid on it to smother it. And never throw water on a grease fire. It will make the fire worse.    Conduct a home safety inspection. Look for anything, such as frayed wires and cords, that can cause a fire. Fix or remove any fire safety hazards you find.    Keep all matches and lighters in a secured drawer or cabinet out of the reach of children. Use childproof lighters.    Check to make sure all appliances, including the stove, are  turned off before leaving the home.    Know where the gas main shut-off is located.    Make sure space heaters are stable and have protective covers. Keep them at least 3 feet from anything that can burn, such as curtains. Dont use space heaters in areas where young kids spend time alone.    Keep flammable liquids such as kerosene and gasoline locked up and safely stored away from kids and heat.    Keep all smoking materials out of reach of children. And never smoke in bed. If possible, smoke outdoors only.  Outdoor Fire Safety  Fire can be a hazard outdoors as well as indoors. When outdoors, be sure to do the following:    Always supervise kids near a barbecue grill, campfire, or portable stove.    Dont use fire pits around children. Kids can fall into them, and pits can be hot even after the fire goes out.    Keep a garden hose or fire extinguisher handy when cooking outdoors in case of fire.  Teaching Your Child About Fire Safety  One of the best ways to keep your home safe from fire is education. Make sure everyone in your family knows fire safety rules, including children.    Teach your children the dangers of matches, lighters, and other dangerous items.    Teach them to never touch these and other objects that are hot, such as candles.    Have them tell you right away whenever they find matches or lighters. Explain that these items are tools for grown-ups, not toys. And never amuse children with matches or lighters.    Round up all matches and lighters and store them safely. In case you missed some, ask your children to tell you where any are located throughout your home.    Never leave a child alone in a room with a lit candle. Dont allow teens to have candles in their rooms.    Show children what to do in case of fire.    Be sure your kids know what the fire alarm sounds like and what to do if it goes off.    Teach kids what to do if their clothes catch fire: Stop, Drop to the ground, and Roll until the  fire is put out. They should also cover their face with their hands. Practice these steps with your children. Make sure they understand that running will make the fire burn faster.    Show children how to crawl below smoke during a fire.    Make sure kids know at least two escape routes from each room in the home. These escape routes can be windows.    Teach kids to test doors for nearby fire by feeling for heat with the back of their hand. If the door is warm or hot, they should try their second exit.    Explain to children that they cant hide from a fire. Hiding in a closet or under a bed wont make them safe. Instead, they should try to escape the home. And if they cant escape, they should let others know they are trapped. They can do this by shutting the door to the room, opening a window, and turning on the lights.    Talk to your local fire department.    Introduce your children to a . Let them know that firefighters will look different when in full protective gear. Tell them to never hide from firefighters, and to follow all directions from firefighters during a fire.    Find out if the fire department has a fire safety program for kids.      Create a Fire Safety Plan  Create a plan for your family to follow in case of a fire. Try making it a family project. Important steps for the plan include leaving the home right away and having a designated meeting place.    Make sure your child understands to get out and stay out. He or she should get out of the home immediately and not go back in, even if family members or pets are still inside.    Decide on a safe meeting place away from the home for everyone to gather.    Teach children to call 911 or emergency services from a cell phone or neighbors phone. Make sure they know to do this only after they are safely out of the home.    Teach your children the fire safety plan. Practice it and make sure they understand it.    Have fire drills twice a year to  keep your children prepared in case of fire.    Visit the National Fire Protection Association web site at www.nfpa.org for more information.      4473-8450 [employer]. [address] [place] [address], [place] [address]. All rights reserved. This information is not intended as a substitute for professional medical care. Always follow your healthcare professional's instructions.         Patient Education   Urinary Incontinence, Female (Adult)  Urinary incontinence means loss of control of the bladder. This problem affects many women, especially as they get older. If you have incontinence, you may be embarrassed to ask for help. But know that this problem can be treated.  Types of Incontinence  There are different types of incontinence. Two of the main types are described here. You can have more than one type.    Stress incontinence. With this type, urine leaks when pressure (stress) is put on the bladder. This may happen when you cough, sneeze, or laugh. Stress incontinence most often occurs because the pelvic floor muscles that support the bladder and urethra are weak. This can happen after pregnancy and vaginal childbirth or a hysterectomy. It can also be due to excess body weight or hormone changes.    Urge incontinence (also called overactive bladder). With this type, a sudden urge to urinate is felt often. This may happen even though there may not be much urine in the bladder. The need to urinate often during the night is common. Urge incontinence most often occurs because of bladder spasms. This may be due to bladder irritation or infection. Damage to bladder nerves or pelvic muscles, constipation, and certain medicines can also lead to urge incontinence.  Treatment of urinary incontinence depends on the cause. Further evaluation is needed to find the type you have. This will likely include an exam and certain tests. Based on the results, you and your healthcare provider can then plan treatment. Until a  diagnosis is made, the home care tips below can help relieve symptoms.  Home care    Do pelvic floor muscle exercises, if they are prescribed. The pelvic floor muscles help support the bladder and urethra. Many women find that their symptoms improve when doing special exercises that strengthen these muscles. To do the exercises contract the muscles you would use to stop your stream of urine, but do this when youre not urinating. Hold for 10 seconds, then relax. Repeat 10 to 20 times in a row, at least 3 times a day. Your provider may give you other instructions for how to do the exercises and how often.    Keep a bladder diary. This helps track how often and how much you urinate over a set period of time. Bring this diary with you to your next visit with the provider. The information can help your provider learn more about your bladder problem.    Lose weight, if advised to by your provider. Excess weight puts pressure on the bladder. Your provider can help you create a weight-loss plan thats right for you. This may include exercising more and making certain diet changes.    Don't consume foods and drinks that may irritate the bladder. These can include alcohol and caffeinated drinks.    Quit smoking. Smoking and other tobacco use can lead to chronic cough that strains the pelvic floor muscles. Smoking may also damage the bladder and urethra. Talk with your provider about treatments or methods you can use to quit smoking.    If drinking large amounts of fluid causes you to have symptoms, you may be advised to limit your fluid intake. You may also be advised to drink most of your fluids during the day and to limit fluids at night.    If youre worried about urine leakage or accidents, you may wear absorbent pads to catch urine. Change the pads often. This helps reduce discomfort. It may also reduce the risk of skin or bladder infections.  Follow-up care  Follow up with your healthcare provider, or as directed. It may  take some to find the right treatment for your problem. Your treatment plan may include special therapies or medicines. Certain procedures or surgery may also be options. Be sure to discuss any questions you have with your provider.  When to seek medical advice  Call the healthcare provider right away if any of these occur:    Fever of 100.4 F (38 C) or higher, or as directed by your provider    Bladder pain or fullness    Abdominal swelling    Nausea or vomiting    Back pain    Weakness, dizziness or fainting  Date Last Reviewed: 10/1/2017    8654-6066 [employer]. [address] [place] [address]. All rights reserved. This information is not intended as a substitute for professional medical care. Always follow your healthcare professional's instructions.     Patient Education     Kegel Exercises  Kegel exercises dont need special clothing or equipment. Theyre easy to learn and simple to do. And if you do them right, no one can tell youre doing them, so they can be done almost anywhere. Your healthcare provider, nurse, or physical therapist can answer any questions you have and help you get started.    A weak pelvic floor  The pelvic floor muscles may weaken due to aging, pregnancy and vaginal childbirth, injury, surgery, chronic cough, or lack of exercise. If the pelvic floor is weak, your bladder and other pelvic organs may sag out of place. The urethra may also open too easily and allow urine to leak out. Kegel exercises can help you strengthen your pelvic floor muscles. Then they can better support the pelvic organs and control urine flow.  How Kegel exercises are done  Try each of the Kegel exercises described below. When youre doing them, try not to move your leg, buttock, or stomach muscles:    Contract as if you were stopping your urine stream. But do it when youre not urinating.    Tighten your rectum as if trying not to pass gas. Contract your anus, but dont move your  buttocks.    You may place a finger or 2 in the vagina and squeeze your finger with your vagina to learn which muscles to tighten.  Try to hold each Kegel for a slow count to 5. You probably wont be able to hold them for that long at first. But keep practicing. It will get easier as your pelvic floor gets stronger. Eventually, special weights that you place in your vagina may be recommended to help make your Kegels even more effective. Visit your healthcare provider if you have difficulties doing Kegel exercises.  Helpful hints  Here are some tips to follow:    Do your Kegels as often as you can. The more you do them, the faster youll feel the results.    Pick an activity you do often as a reminder. For instance, do your Kegels every time you sit down.    Tighten your pelvic floor before you sneeze, get up from a chair, cough, laugh, or lift. This protects your pelvic floor from injury and can help prevent urine leakage.   Date Last Reviewed: 10/1/2017    5650-6181 [employer]. [address] [place] [address]. All rights reserved. This information is not intended as a substitute for professional medical care. Always follow your healthcare professional's instructions.     Patient Education   Preventing Falls in the Home  As you get older, falls are more likely. Thats because your reaction time slows. Your muscles and joints may also get stiffer, making them less flexible. Illness, medications, and vision changes can also affect your balance. A fall could leave you unable to live on your own. To make your home safer, follow these tips:    Floors    Put nonskid pads under area rugs.    Remove throw rugs.    Replace worn floor coverings.    Tack carpets firmly to each step on carpeted stairs. Put nonskid strips on the edges of uncarpeted stairs.    Keep floors and stairs free of clutter and cords.    Arrange furniture so there are clear pathways.    Clean up any spills right  away.    Bathrooms    Install grab bars in the tub or shower.    Apply nonskid strips or put a nonskid rubber mat in the tub or shower.    Sit on a bath chair to bathe.    Use bathmats with nonskid backing.    Lighting    Keep a flashlight in each room.    Put a nightlight along the pathway between the bedroom and the bathroom.    8268-2493 [employer]. [address] [place] [address], [place] [address]. All rights reserved. This information is not intended as a substitute for professional medical care. Always follow your healthcare professional's instructions.           Advance Directive  Patients advance directive was discussed and I am comfortable with the patients wishes.  Patient Education   Personalized Prevention Plan  You are due for the preventive services outlined below.  Your care team is available to assist you in scheduling these services.  If you have already completed any of these items, please share that information with your care team to update in your medical record.  Health Maintenance   Topic Date Due   ? TD 18+ HE  12/17/1958   ? PNEUMOCOCCAL CONJUGATE VACCINE FOR ADULTS (PCV13 OR PREVNAR)  12/17/2003   ? ZOSTER VACCINES (2 of 3) 03/12/2007   ? FALL RISK ASSESSMENT  07/27/2019   ? INFLUENZA VACCINE RULE BASED (Season Ended) 08/01/2019   ? ADVANCE DIRECTIVES DISCUSSED WITH PATIENT  08/18/2022   ? PNEUMOCOCCAL POLYSACCHARIDE VACCINE AGE 65 AND OVER  Completed   ? DXA SCAN  Discontinued

## 2021-06-19 NOTE — PROGRESS NOTES
"Patient complains of nausea with no vomitig, chills "I just feel terrible". Reports onset of symptoms last night approx 10pm.     Level of Consciousness: Patient is awake, alert, and oriented to person, place, time, and situation. Speech is clear.   Appearance: Patient resting comfortably in bed, hygiene and clothing are both intact and appropriate.   Skin: Skin is warm, dry, and intact. Skin is of normal color, free of any skin breakdown. Mucus membranes pink and moist.   Musculoskeletal: Moves all extremities well. Full active ROM. No deformities noted. Denies any weakness. Gait steady, patient ambulates independently, without assistive device.   Respiratory: Airway open and patent. Respirations equal and unlabored. Lung sounds clear upon auscultation. Patient denies any SOB.   Cardiac: Regular rate and rhythm. No peripheral edema noted to bilateral lower extremities. Denies any chest pain or discomfort.   GI: Abdomen soft, non-tender. Bowel sounds present and active in all quadrants x 4. No distention noted. Denies vomiting.   : Denies any problem with urination. Denies any problem with bowel movement.   Neurological: Normal sensation reported to all extremities. Symmetrical expressions noted to face. No obvious neurological deficits noted.   Psychosocial: Patient is calm and cooperative, appropriate to situation.     Patient informed of plan of care, verbalizes understanding, and has no questions or concern at this time. Bed is in the lowest position and locked. Call bell within reach of the patient. Will continue to monitor.   " Shira Hubbard  1940      Assessment and Plan:  1 Shira is doing very well high functioning looking forward to turning 78 in December; she was seen by cardiology treated with diltiazem for atrial irritability she did not have atrial fib she thinks Cardizem gave her rash so it was discontinued  2 she had some type of neurologic event where she had trouble speaking and word finding whether was a migraine or TIA is uncertain she has been worked up for that no special intervention she has not had any recurrence consult notes from neuro and cardiology  3 blood pressure under control same medications  4 she wants to see a dermatologist for what looks like benign skin lesions referral has been made  5 hyperlipidemia she tolerates atorvastatin      Chief Complaint: Follow-up general    Visit diagnoses:    1. Essential hypertension     2. Skin lesion  Ambulatory referral to Dermatology   3. Heart palpitations     4. TIA (transient ischemic attack)     5. Hyperlipemia         Meds:  Current Outpatient Prescriptions   Medication Sig Dispense Refill     aspirin 81 MG EC tablet Take 81 mg by mouth at bedtime.       atorvastatin (LIPITOR) 20 MG tablet Take 20 mg by mouth at bedtime.       cholecalciferol, vitamin D3, (VITAMIN D3) 2,000 unit Tab Take 2,000 Units by mouth daily.       hydroCHLOROthiazide (HYDRODIURIL) 12.5 MG tablet Take 12.5 mg by mouth daily.       levothyroxine (SYNTHROID, LEVOTHROID) 150 MCG tablet Take 150 mcg by mouth daily.       losartan (COZAAR) 100 MG tablet Take 100 mg by mouth daily. Take 0.5 tablets in the AM and PM       pantoprazole (PROTONIX) 20 MG tablet Take 20 mg by mouth 2 (two) times a day.       No current facility-administered medications for this visit.        Allergies   Allergen Reactions     Penicillins      Diltiazem Rash          Patient Active Problem List   Diagnosis     Irritable Bowel Syndrome     Hypothyroidism     Mixed hyperlipidemia     Essential Hypertension      Collagenous Colitis     Osteopenia     Heart palpitations     TIA (transient ischemic attack)     Past Medical History:   Diagnosis Date     Cancer (H) 1974    Breast cancer bilateral mastectomies     Cerebral vascular accident (H)      Family history of myocardial infarction      Hypertension      Past Surgical History:   Procedure Laterality Date     BREAST SURGERY Bilateral 1974    Bilateral mastectomy     AR MASTECTOMY, RADICAL Bilateral 1974    Remote breast cancer     TENDON REPAIR Left 1990     Social History     Social History     Marital status:      Spouse name: Eric     Number of children: N/A     Years of education: N/A     Occupational History     Retired      Social History Main Topics     Smoking status: Former Smoker     Types: Cigarettes     Smokeless tobacco: Former User     Alcohol use Not on file     Drug use: Not on file     Sexual activity: Not on file     Other Topics Concern     Not on file     Social History Narrative     50+ years 3 grown children to live in [facility] son lives in [place] [facility].  Her  Eric is a retired [employer] executive.  The spend winter months in [place] and [place] [facility] [place] [facility].  Non-smoker; no alcohol issues.     family history includes CABG in her brother and father; Coronary artery disease in her brother, father, and mother; Sudden death in her mother.  ROS: complete review of symptoms otherwise negative except as noted below    S: She is doing very well she is relatively new patient here although I have not seen her  for about 15 years he is a retired [employer] exact.  She has been seen by cardiology and neurology she has had some spells going back many years of difficulty speaking and word finding there is consideration of TIA but she may be having a migraine variant.  In any case she has not had any new issues or concerns.  She has a very high somatic awareness had what seemed to be a  nuisance type of rash and left shoulder was convinced that it was diltiazem which Dr. Dale had prescribed and cardiology for her PACs which were new since she discontinued that she has been fine as far as palpitations go.  She is a variety of benign-appearing skin lesions wants to see dermatology referral has been made otherwise she tolerates her medications recent blood tests were all stable       O:   Vitals:    07/27/18 1123   BP: 120/70   Patient Site: Left Arm   Patient Position: Sitting   Cuff Size: Adult Large   Pulse: 67   SpO2: 95%   Weight: 204 lb 6.4 oz (92.7 kg)       Physical Exam:  General-  VS- see above  HEENT- neg   Neck- no adenopathy/thyromegaly/bruits  Chest- clear   Cor- reg no murmurs/gallops/ectopics  Extremities: no edema, good distal pulses  Neuro- Cr. NN-  intact, alert,   Abdomen- soft non tender, no masses; no organomegaly  Skin- no suspicious lesions for malignancy,  benign-appearing papules lesions she request dermatology referral  Lymph- no pathologic nodes in neck/axilla/groin  Musculoskeletal-  Breasts: -Not examined history of mastectomy          Jan Blackburn MD      Recent Results (from the past 240 hour(s))   HM2(CBC w/o Differential)   Result Value Ref Range    WBC 4.5 4.0 - 11.0 thou/uL    RBC 4.72 3.80 - 5.40 mill/uL    Hemoglobin 15.0 12.0 - 16.0 g/dL    Hematocrit 43.7 35.0 - 47.0 %    MCV 93 80 - 100 fL    MCH 31.7 27.0 - 34.0 pg    MCHC 34.2 32.0 - 36.0 g/dL    RDW 10.9 (L) 11.0 - 14.5 %    Platelets 202 140 - 440 thou/uL    MPV 6.9 (L) 7.0 - 10.0 fL   Thyroid Stimulating Hormone (TSH)   Result Value Ref Range    TSH 0.14 (L) 0.30 - 5.00 uIU/mL   Comprehensive Metabolic Panel   Result Value Ref Range    Sodium 144 136 - 145 mmol/L    Potassium 3.9 3.5 - 5.0 mmol/L    Chloride 106 98 - 107 mmol/L    CO2 29 22 - 31 mmol/L    Anion Gap, Calculation 9 5 - 18 mmol/L    Glucose 103 70 - 125 mg/dL    BUN 15 8 - 28 mg/dL    Creatinine 0.71 0.60 - 1.10 mg/dL    GFR MDRD Af Amer >60  >60 mL/min/1.73m2    GFR MDRD Non Af Amer >60 >60 mL/min/1.73m2    Bilirubin, Total 0.9 0.0 - 1.0 mg/dL    Calcium 9.4 8.5 - 10.5 mg/dL    Protein, Total 6.6 6.0 - 8.0 g/dL    Albumin 3.9 3.5 - 5.0 g/dL    Alkaline Phosphatase 94 45 - 120 U/L    AST 22 0 - 40 U/L    ALT 27 0 - 45 U/L   Lipid Cascade   Result Value Ref Range    Cholesterol 138 <=199 mg/dL    Triglycerides 91 <=149 mg/dL    HDL Cholesterol 53 >=50 mg/dL    LDL Calculated 67 <=129 mg/dL    Patient Fasting > 8hrs? Yes    Vitamin D, Total (25-Hydroxy)   Result Value Ref Range    Vitamin D, Total (25-Hydroxy) 30.3 30.0 - 80.0 ng/mL   Urinalysis-UC if Indicated   Result Value Ref Range    Color, UA Yellow Colorless, Yellow, Straw, Light Yellow    Clarity, UA Clear Clear    Glucose, UA Negative Negative    Bilirubin, UA Negative Negative    Ketones, UA Negative Negative    Specific Gravity, UA 1.010 1.005 - 1.030    Blood, UA Negative Negative    pH, UA 6.0 5.0 - 8.0    Protein, UA Negative Negative mg/dL    Urobilinogen, UA 0.2 E.U./dL 0.2 E.U./dL, 1.0 E.U./dL    Nitrite, UA Negative Negative    Leukocytes, UA Small (!) Negative   Culture, Urine   Result Value Ref Range    Culture No Growth

## 2021-06-19 NOTE — LETTER
Letter by Renata Easley MD at      Author: Renata Easley MD Service: -- Author Type: --    Filed:  Encounter Date: 6/12/2019 Status: (Other)         Shira Hubbard  [address]  [facility] [id number]             June 12, 2019         Dear Ms. Hubbard,    Below are the results from your recent visit:      1) Vitamin D level is great. Continue vitamin D as she is taking.   2) Electrolytes and kidney function are great.   3) Fasting glucose is great at 106.   4) Total cholesterol is 122 and bad cholesterol is 61, which is great. Her good cholesterol HDL is low at 41. Increasing physical activity will help bring that number up.   5) Thyroid testing shows that her dose of synthroid is a little too high. I want you to decrease her synthroid from 150mcg to 125 mcg daily and repeat the TSH test in 4-6 weeks (she can just go to lab for lab only visit). I will send new Rx for synthroid.     Dr. Lopez     Resulted Orders   Basic Metabolic Panel   Result Value Ref Range    Sodium 141 136 - 145 mmol/L    Potassium 3.9 3.5 - 5.0 mmol/L    Chloride 104 98 - 107 mmol/L    CO2 28 22 - 31 mmol/L    Anion Gap, Calculation 9 5 - 18 mmol/L    Glucose 106 70 - 125 mg/dL    Calcium 9.7 8.5 - 10.5 mg/dL    BUN 13 8 - 28 mg/dL    Creatinine 0.68 0.60 - 1.10 mg/dL    GFR MDRD Af Amer >60 >60 mL/min/1.73m2    GFR MDRD Non Af Amer >60 >60 mL/min/1.73m2    Narrative    Fasting Glucose reference range is 70-99 mg/dL per  American Diabetes Association (ADA) guidelines.   Thyroid Stimulating Hormone (TSH)   Result Value Ref Range    TSH 0.03 (L) 0.30 - 5.00 uIU/mL   Vitamin D, Total (25-Hydroxy)   Result Value Ref Range    Vitamin D, Total (25-Hydroxy) 33.3 30.0 - 80.0 ng/mL    Narrative    Deficiency <10.0 ng/mL  Insufficiency 10.0-29.9 ng/mL  Sufficiency 30.0-80.0 ng/mL  Toxicity (possible) >100.0 ng/mL   Lipid Cascade, FASTING   Result Value Ref Range    Cholesterol 122 <=199 mg/dL    Triglycerides 98 <=149  mg/dL    HDL Cholesterol 41 (L) >=50 mg/dL    LDL Calculated 61 <=129 mg/dL    Patient Fasting > 8hrs? Unknown            Please call with questions or contact us using [email or web address].    Sincerely,        Electronically signed by Renata Easley MD

## 2021-06-23 NOTE — TELEPHONE ENCOUNTER
Refill Approved    Rx renewed per Medication Renewal Policy. Medication was last renewed on 11/9/18.    Lois Walton, [facility] Triage/Med Refill 1/24/2019     Requested Prescriptions   Pending Prescriptions Disp Refills     atorvastatin (LIPITOR) 20 MG tablet [Pharmacy Med Name: ATORVASTATIN CALCIUM 20 MG Tablet] 90 tablet 0     Sig: TAKE 1 TABLET AT BEDTIME    Statins Refill Protocol (Hmg CoA Reductase Inhibitors) Passed - 1/22/2019  1:48 PM       Passed - PCP or prescribing provider visit in past 12 months     Last office visit with prescriber/PCP: 7/27/2018 Jan Blackburn MD OR same dept: 7/27/2018 Jan Blackburn MD OR same specialty: 7/27/2018 Jan Blackburn MD  Last physical: Visit date not found Last MTM visit: Visit date not found   Next visit within 3 mo: Visit date not found  Next physical within 3 mo: Visit date not found  Prescriber OR PCP: Jan Blackburn MD  Last diagnosis associated with med order: There are no diagnoses linked to this encounter.  If protocol passes may refill for 12 months if within 3 months of last provider visit (or a total of 15 months).             levothyroxine (SYNTHROID, LEVOTHROID) 150 MCG tablet [Pharmacy Med Name: LEVOTHYROXINE SODIUM 150 MCG Tablet] 90 tablet 0     Sig: TAKE 1 TABLET EVERY DAY    Thyroid Hormones Protocol Passed - 1/22/2019  1:48 PM       Passed - Provider visit in past 12 months or next 3 months    Last office visit with prescriber/PCP: 7/27/2018 Jan Blackburn MD OR same dept: 7/27/2018 Jan Blackburn MD OR same specialty: 7/27/2018 Jan Blackburn MD  Last physical: Visit date not found Last MTM visit: Visit date not found   Next visit within 3 mo: Visit date not found  Next physical within 3 mo: Visit date not found  Prescriber OR PCP: Jan Blackburn MD  Last diagnosis associated with med order: There are no diagnoses linked to this encounter.  If protocol passes may refill for 12 months if within  3 months of last provider visit (or a total of 15 months).            Passed - TSH on file in past 12 months for patient age 12 & older    TSH   Date Value Ref Range Status   07/23/2018 0.14 (L) 0.30 - 5.00 uIU/mL Final                   hydroCHLOROthiazide (HYDRODIURIL) 12.5 MG tablet [Pharmacy Med Name: HYDROCHLOROTHIAZIDE 12.5 MG Tablet] 90 tablet 0     Sig: TAKE 1 TABLET EVERY DAY    Diuretics/Combination Diuretics Refill Protocol  Passed - 1/22/2019  1:48 PM       Passed - Visit with PCP or prescribing provider visit in past 12 months    Last office visit with prescriber/PCP: 7/27/2018 Jan Blackburn MD OR same dept: 7/27/2018 Jan Blackburn MD OR same specialty: 7/27/2018 Jan Blackburn MD  Last physical: Visit date not found Last MTM visit: Visit date not found   Next visit within 3 mo: Visit date not found  Next physical within 3 mo: Visit date not found  Prescriber OR PCP: Jan Blackburn MD  Last diagnosis associated with med order: There are no diagnoses linked to this encounter.  If protocol passes may refill for 12 months if within 3 months of last provider visit (or a total of 15 months).            Passed - Serum Potassium in past 12 months     Lab Results   Component Value Date    Potassium 3.9 07/23/2018            Passed - Serum Sodium in past 12 months     Lab Results   Component Value Date    Sodium 144 07/23/2018            Passed - Blood pressure on file in past 12 months    BP Readings from Last 1 Encounters:   07/27/18 120/70            Passed - Serum Creatinine in past 12 months     Creatinine   Date Value Ref Range Status   07/23/2018 0.71 0.60 - 1.10 mg/dL Final

## 2021-06-25 NOTE — PROGRESS NOTES
Progress Notes by Jose Dale MD at 9/18/2017  1:30 PM     Author: Jose Dale MD Service: -- Author Type: Physician    Filed: 9/18/2017  1:54 PM Encounter Date: 9/18/2017 Status: Signed    : Jose Dale MD (Physician)           Click to link to [facility] Heart [facility] HEART [facility] NOTE       Assessment/Plan:   1. Frequent PACs: The patient has no chronic lung disease. No indication of GARRETT.  The etiology of frequent PACs is not clear. Nuclear stress test was negative for inducible myocardial ischemia..  Her palpitation was improved after using diltiazem 120 mg daily.      3. Essential hypertension: Continue Losartan, HCTZ and diltiazem. Her blood pressure is controlled.    4. Hyperlipidemia: Continue Lipitor 20 mg daily.    Thank you for the opportunity to be involved in the care of Shira Hubbard. If you have any questions, please feel free to contact me.  I will see the patient again in 12 months.    Much or all of the text in this note was generated through the use of Dragon Dictate voice-to-text software. Errors in spelling or words which seem out of context are unintentional.   Sound alike errors, in particular, may have escaped editing.       History of Present Illness:   It is my pleasure to see Shira Hubbard at the [facility] clinic for routine cardiology follow-up and discussing nuclear stress test report. Shira Hubbard is a 76 y.o. female with a medical history of essential hypertension, hyperlipidemia, hypothyroidism and family history of coronary artery disease.      The patient states that she had a possible episode of TIA one year ago.. She had extensive cardiac evaluation but all exams were negative.     The patient states that her irregular heartbeats were improved after amlodipine was changed to diltiazem 120 mg daily.  Her dyspnea on exertion was also improved.  She has no chest pain, dizziness, orthopnea PND or leg swelling.  Her blood pressure and heart rate  are in normal range.      She had one episode of dysarthria for several minutes 3 weeks. She had a Holter monitor which I personally reviewed and was reported 19% of PACs, rare PVC, no atrial fibrillation.      Nuclear stress test that was negative for inducible myocardial ischemia.  Left ventricular ejection fraction was 63%.    Past Medical History:     Patient Active Problem List   Diagnosis   ? Irritable Bowel Syndrome   ? Hypothyroidism   ? Mixed hyperlipidemia   ? Essential Hypertension   ? Collagenous Colitis   ? Osteopenia   ? Heart palpitations   ? TIA (transient ischemic attack)       Past Surgical History:     Past Surgical History:   Procedure Laterality Date   ? MI MASTECTOMY, RADICAL      Description: Radical Mastectomy Bilateral;  Recorded: 05/15/2012;       Family History:     Family History   Problem Relation Age of Onset   ? Coronary artery disease Mother    ? Sudden death Mother    ? Coronary artery disease Father    ? CABG Father    ? Coronary artery disease Brother    ? CABG Brother         Social History:    reports that she has quit smoking. Her smoking use included Cigarettes. She has quit using smokeless tobacco.    Review of Systems:   General: WNL  Eyes: WNL  Ears/Nose/Throat: WNL  Lungs: WNL  Heart: Irregular Heartbeat  Stomach: WNL  Bladder: WNL  Muscle/Joints: WNL  Skin: WNL  Nervous System: WNL  Mental Health: WNL     Blood: WNL    Meds:     Current Outpatient Prescriptions:   ?  aspirin 81 MG EC tablet, Take 81 mg by mouth at bedtime., Disp: , Rfl:   ?  atorvastatin (LIPITOR) 20 MG tablet, Take 20 mg by mouth at bedtime., Disp: , Rfl:   ?  cholecalciferol, vitamin D3, (VITAMIN D3) 2,000 unit Tab, Take 2,000 Units by mouth daily., Disp: , Rfl:   ?  diltiazem (CARDIZEM CD) 120 MG 24 hr capsule, Take 1 capsule (120 mg total) by mouth daily., Disp: 90 capsule, Rfl: 4  ?  hydroCHLOROthiazide (HYDRODIURIL) 12.5 MG tablet, Take 12.5 mg by mouth daily., Disp: , Rfl:   ?  levothyroxine  "(SYNTHROID, LEVOTHROID) 150 MCG tablet, Take 150 mcg by mouth daily., Disp: , Rfl:   ?  losartan (COZAAR) 100 MG tablet, Take 100 mg by mouth daily. Take 0.5 tablets in the AM and PM, Disp: , Rfl:   ?  pantoprazole (PROTONIX) 20 MG tablet, Take 20 mg by mouth 2 (two) times a day., Disp: , Rfl:     Allergies:   Penicillins      Objective:      Physical Exam  206 lb 9.6 oz (93.7 kg)  5' 4\" (1.626 m)  Body mass index is 35.46 kg/(m^2).  /48 (Patient Site: Right Arm, Patient Position: Sitting, Cuff Size: Adult Large)  Pulse (!) 56  Resp 16  Ht 5' 4\" (1.626 m)  Wt 206 lb 9.6 oz (93.7 kg)  BMI 35.46 kg/m2    General Appearance:   Awake, Alert, No acute distress.   HEENT:  Pupil equal and reactive to light. No scleral icterus; the mucous membranes were moist.   Neck: No cervical bruits. No JVD. No thyromegaly.     Chest: The spine was straight. The chest was symmetric.   Lungs:   Respirations unlabored; Lungs are clear to auscultation. No crackles. No wheezing.   Cardiovascular:   Regular rhythm and rate, normal first and second heart sounds with no murmurs. No rubs or gallops.    Abdomen:  Obese. Soft. No tenderness. Non-distended. Bowels sounds are present   Extremities: Equal tibial pulses. No leg edema.   Skin: No rashes or ulcers. Warm, Dry.   Musculoskeletal: No tenderness. No deformity.   Neurologic: Mood and affect are appropriate. No focal deficits.         Cardiac Imaging Studies  Nuclear stress test on 8-:    1.The pharmacologic nuclear stress test is negative for inducible myocardial ischemia or infarction.    2.The left ventricular ejection fraction is 63%.    3.There is no prior study available.    4.Some basal anterior defect is seen in a small area on the stress images and this is presumed to be breast attenuation.    Lab Review   Lab Results   Component Value Date     08/18/2017    K 3.9 08/18/2017     08/18/2017    CO2 28 08/18/2017    BUN 15 08/18/2017    CREATININE 0.74 " 08/18/2017    CALCIUM 9.3 08/18/2017     Lab Results   Component Value Date    WBC 4.6 08/18/2017    HGB 15.0 08/18/2017    HCT 43.7 08/18/2017    MCV 93 08/18/2017     08/18/2017     Lab Results   Component Value Date    CHOL 133 08/18/2017    CHOL 150 05/15/2012    CHOL 146 05/06/2011     Lab Results   Component Value Date    HDL 46 (L) 08/18/2017    HDL 57 05/15/2012    HDL 72 05/06/2011     Lab Results   Component Value Date    LDLCALC 70 08/18/2017    LDLCALC 63.4 05/06/2011    LDLCALC 55.4 05/24/2010     Lab Results   Component Value Date    TRIG 84 08/18/2017    TRIG 53 05/06/2011    TRIG 43 05/24/2010     Lab Results   Component Value Date    TSH 0.20 (L) 08/18/2017

## 2021-06-25 NOTE — PROGRESS NOTES
Progress Notes by Jose Dale MD at 8/15/2017 10:30 AM     Author: Jose Dale MD Service: -- Author Type: Physician    Filed: 8/15/2017  1:31 PM Encounter Date: 8/15/2017 Status: Signed    : Jose Dale MD (Physician)           Click to link to [facility] NOTE    Thank you, Dr. Blackburn, for asking me to see Shira Hubbard in consultation at [facility] to evaluate irregular heart beats and dyspnea on exertion.      Assessment/Plan:   1. Dyspnea on exertion: The patient's dyspnea on exertion and also complains of fatigue which could be angina equivaent. She has multiple risk factors including HTN, HLPD, FH and her age.   Nuclear stress test is requested for ischemic evaluation.    2. Frequent PACs: The patient has no chronic lung disease. No indication of GARRETT.  The etiology of frequent PACs is not clear. Nuclear stress test is ordered for ischemic evaluation.  Change amlodipine 5 mg daily to diltiazem  mg daily for possible suppression of PACs.  No indication of chronic anticoagulation.  She had Holter monitor done at outside facility.  I personally reviewed the report. No episode of atrial fibrillation.  She had similar complains during Holter monitor.    3. Essential hypertension: Continue Losartan, HCTZ and diltiazem. Her blood pressure is controlled.    4. Hyperlipidemia: Continue Lipitor 20 mg daily.      Thank you for the opportunity to be involved in the care of Shira Hubbard. If you have any questions, please feel free to contact me.  I will see the patient again in 4 weeks.    Much or all of the text in this note was generated through the use of Dragon Dictate voice-to-text software. Errors in spelling or words which seem out of context are unintentional.   Sound alike errors, in particular, may have escaped editing.       History of Present Illness:   It is my pleasure to see Shira Hubbard at the [facility] for  evaluation of Consult. Shira Hubbard is a 76 y.o. female with a medical history of essential hypertension, hyperlipidemia, hypothyroidism and family history of coronary artery disease.      The patient states that she had a possible episode of TIA one year ago.. She had extensive cardiac evaluation but all exams were negative.     The patient developed dyspnea on exertion for at least 3 months. She had no obvious chest pain.  She felt irregular heart beats, but no obvious racing heart beats. She had occasional lightheadedness, no syncope.  She had no orthopnea or leg edema.  Her weight has been stable.  Her blood pressure and heart rate are controlled.    She had one episode of dysarthria for several minutes 3 weeks. She had a Holter monitor which I personally reviewed and was reported 19% of PACs, rare PVC, no atrial fibrillation.     Past Medical History:     Patient Active Problem List   Diagnosis   ? Irritable Bowel Syndrome   ? Hypothyroidism   ? Hyperlipidemia   ? Essential Hypertension   ? Collagenous Colitis   ? Osteopenia       Past Surgical History:     Past Surgical History:   Procedure Laterality Date   ? [place] MASTECTOMY, RADICAL      Description: Radical Mastectomy Bilateral;  Recorded: 05/15/2012;       Family History:     Family History   Problem Relation Age of Onset   ? Coronary artery disease Mother    ? Sudden death Mother    ? Coronary artery disease Father    ? CABG Father    ? Coronary artery disease Brother    ? CABG Brother        Social History:    reports that she has quit smoking. Her smoking use included Cigarettes. She has quit using smokeless tobacco.    Review of Systems:   General: WNL  Eyes: WNL  Ears/Nose/Throat: WNL  Lungs: WNL  Heart: Shortness of Breath with activity, Irregular Heartbeat  Stomach: WNL  Bladder: WNL  Muscle/Joints: WNL  Skin: WNL  Nervous System: WNL  Mental Health: WNL     Blood: WNL    Meds:     Current Outpatient Prescriptions:   ?  aspirin 81 MG EC tablet,  "Take 81 mg by mouth at bedtime., Disp: , Rfl:   ?  atorvastatin (LIPITOR) 20 MG tablet, Take 20 mg by mouth at bedtime., Disp: , Rfl:   ?  cholecalciferol, vitamin D3, (VITAMIN D3) 2,000 unit Tab, Take 2,000 Units by mouth daily., Disp: , Rfl:   ?  hydroCHLOROthiazide (HYDRODIURIL) 12.5 MG tablet, Take 12.5 mg by mouth daily., Disp: , Rfl:   ?  levothyroxine (SYNTHROID, LEVOTHROID) 150 MCG tablet, Take 150 mcg by mouth daily., Disp: , Rfl:   ?  losartan (COZAAR) 100 MG tablet, Take 100 mg by mouth daily. Take 0.5 tablets in the AM and PM, Disp: , Rfl:   ?  pantoprazole (PROTONIX) 20 MG tablet, Take 20 mg by mouth 2 (two) times a day., Disp: , Rfl:   ?  diltiazem (CARDIZEM CD) 120 MG 24 hr capsule, Take 1 capsule (120 mg total) by mouth daily., Disp: 30 capsule, Rfl: 11     Allergies:   Penicillins    Objective:      Physical Exam  207 lb 11.2 oz (94.2 kg)  5' 4.5\" (1.638 m)  Body mass index is 35.1 kg/(m^2).  /66 (Patient Site: Left Arm, Patient Position: Sitting, Cuff Size: Adult Large)  Pulse 68 Comment: Irregular  Resp 12  Ht 5' 4.5\" (1.638 m)  Wt 207 lb 11.2 oz (94.2 kg)  BMI 35.1 kg/m2    General Appearance:   Awake, Alert, No acute distress.   HEENT:  Pupil equal, reactive to light. No scleral icterus; the mucous membranes were moist. No oral ulcers or thrush.    Neck: No cervical bruits. No JVD. No thyromegaly. No lymph node enlargement or tenderness.   Chest: The spine was straight. The chest was symmetric.   Lungs:   Respirations unlabored. Lungs are clear to auscultation. No crackles. No wheezing.   Cardiovascular:   RRR, normal first and second heart sounds with no murmurs. No rubs or gallops.    Abdomen:  Obese. Soft. No tenderness. Non-distended. Bowels sounds are present   Extremities: Equal posterior tibial pulses. Trace leg edema.   Skin: No rashes or ulcers. Warm, Dry.   Musculoskeletal: No tenderness. No deformity.   Neurologic: Mood and affect are appropriate. No focal deficits.     "

## 2021-06-25 NOTE — PROGRESS NOTES
Progress Notes by Jan Blackburn MD at 8/18/2017  9:00 AM     Author: Jan Blackburn MD Service: -- Author Type: Physician    Filed: 8/24/2017  9:17 AM Encounter Date: 8/18/2017 Status: Addendum    : Jan Blackburn MD (Physician)    Related Notes: Original Note by Jan Blackburn MD (Physician) filed at 8/18/2017  2:11 PM       Shira Hubbard  1940      Assessment and Plan:  1.  History of TIAs syndrome but going back perhaps 20+ years suggestive of possible atypical migraine.  Episodes of word finding a aphasia short limited in duration.  Negative MR scan last year in [place].  Will check carotid Dopplers and go from there  2 palpitations PACs he already has a nuclear stress test scheduled see note from cardiology she has a high family history of coronary artery disease and she herself is also at risk  3 hyperlipemia medications are reviewed sustain the same including aspirin which is very important given #1  4 reports from [place] reviewed will be scanned into the record her rhythm has been sinus with PACs    SEE MY-CHART NOTES- STUDIES OK,SEE BELOW- WILL REFER TO NEURO FOR CONSULT    Chief Complaint: Multiple    Visit diagnoses:    1. TIA (transient ischemic attack)  US Carotid Bilateral   2. Hyperlipemia  Lipid Cascade    Comprehensive Metabolic Panel    Thyroid Stimulating Hormone (TSH)    HM2(CBC w/o Differential)     Patient Active Problem List   Diagnosis   ? Irritable Bowel Syndrome   ? Hypothyroidism   ? Mixed hyperlipidemia   ? Essential Hypertension   ? Collagenous Colitis   ? Osteopenia   ? Heart palpitations   ? TIA (transient ischemic attack)     No past medical history on file.  Past Surgical History:   Procedure Laterality Date   ? [place] MASTECTOMY, RADICAL      Description: Radical Mastectomy Bilateral;  Recorded: 05/15/2012;     Social History     Social History   ? Marital status:      Spouse name: N/A   ? Number of children: N/A   ? Years of  education: N/A     Occupational History   ? Not on file.     Social History Main Topics   ? Smoking status: Former Smoker     Types: Cigarettes   ? Smokeless tobacco: Former User   ? Alcohol use Not on file   ? Drug use: Not on file   ? Sexual activity: Not on file     Other Topics Concern   ? Not on file     Social History Narrative     Family History   Problem Relation Age of Onset   ? Coronary artery disease Mother    ? Sudden death Mother    ? Coronary artery disease Father    ? CABG Father    ? Coronary artery disease Brother    ? CABG Brother      Meds:  Current Outpatient Prescriptions   Medication Sig Dispense Refill   ? aspirin 81 MG EC tablet Take 81 mg by mouth at bedtime.     ? atorvastatin (LIPITOR) 20 MG tablet Take 20 mg by mouth at bedtime.     ? cholecalciferol, vitamin D3, (VITAMIN D3) 2,000 unit Tab Take 2,000 Units by mouth daily.     ? diltiazem (CARDIZEM CD) 120 MG 24 hr capsule Take 1 capsule (120 mg total) by mouth daily. 30 capsule 11   ? hydroCHLOROthiazide (HYDRODIURIL) 12.5 MG tablet Take 12.5 mg by mouth daily.     ? levothyroxine (SYNTHROID, LEVOTHROID) 150 MCG tablet Take 150 mcg by mouth daily.     ? losartan (COZAAR) 100 MG tablet Take 100 mg by mouth daily. Take 0.5 tablets in the AM and PM     ? pantoprazole (PROTONIX) 20 MG tablet Take 20 mg by mouth 2 (two) times a day.       No current facility-administered medications for this visit.        Allergies   Allergen Reactions   ? Penicillins        ROS: complete review of symptoms otherwise negative except as noted below    S: She spends 5 months of the year in [place] had a workup there last year when she had a brief period of aphasia negative MR scan of the brain at that time but I cannot find any reports of carotid Dopplers or angiogram studies.  She seen cardiology for palpitations see notes from .  Exercise test is scheduled for next week she is here with her  who has been a long-term patient of mine-Eric retired [employer]  "executive.   Henrydavid is been her primary care here and she will be switching to me in view of his detention.  She has a very strong family history of heart disease.  She describes having had about 3 or 4 of these spells which she has trouble speaking and finding words but no typical history of migraine headaches.  Yet her first spell was about 25 or 30 years ago.  She is asymptomatic now the last bowel was a couple weeks ago and then prior to that she had a last year in [place]       O:   Vitals:    08/18/17 0825   BP: 110/70   Pulse: 66   Weight: 210 lb (95.3 kg)   Height: 5' 4\" (1.626 m)       Physical Exam:  General-very pleasant alert here with her very concerned .  Her speech is fluent cranial nerves grossly intact  VS- see above  HEENT- neg   Neck- no adenopathy/thyromegaly/bruits  Chest- clear   Cor- reg no murmurs/gallops/ectopics    Recent Results (from the past 240 hour(s))   HM2(CBC w/o Differential)   Result Value Ref Range    WBC 4.6 4.0 - 11.0 thou/uL    RBC 4.67 3.80 - 5.40 mill/uL    Hemoglobin 15.0 12.0 - 16.0 g/dL    Hematocrit 43.7 35.0 - 47.0 %    MCV 93 80 - 100 fL    MCH 32.1 27.0 - 34.0 pg    MCHC 34.3 32.0 - 36.0 g/dL    RDW 11.1 11.0 - 14.5 %    Platelets 212 140 - 440 thou/uL    MPV 7.2 7.0 - 10.0 fL           Jan Blackburn MD    ADD- stress test ok; dopplers ok see below; will see neuro      Authorizing: Jan Blackburn MD   Study Result   ULTRASOUND CAROTID BILATERAL  08/23/2017, 10:01 AM     INDICATION: Transient cerebral ischemic attack, unspecified.  TECHNIQUE: Duplex exam performed utilizing 2D gray-scale imaging, Doppler interrogation with color-flow and spectral waveform analysis.  COMPARISON: None.     FINDINGS:  RIGHT: There is mild atheromatous plaque. Normal waveforms with no significant stenosis.     LEFT: There is mild atheromatous plaque. Normal waveforms with no significant stenosis.     Both vertebral arteries and subclavian artery waveforms are " normal.     VELOCITY CHART:   The following velocities were obtained in the RIGHT carotid system.  CCA: 61/16 cm/s  ICA: 95/31 cm/s  ECA: 94/10 cm/s  ICA/CCA: PS 1.6     The following velocities were obtained in the LEFT carotid system.  CCA: 82/24 cm/s  ICA: 86/22 cm/s  ECA: 101/12 cm/s  ICA/CCA: PS 1.1      IMPRESSION:   CONCLUSION:  1.  Mild atheromatous plaque in the carotid arteries.  2.  No significant stenosis on either side.     Evaluation based on velocities and NASCET criteria.        EKG Scan    Scan on: 8/23/17 9:29 AM by:   Indications   Dyspnea on exertion   Conclusion     1.The pharmacologic nuclear stress test is negative for inducible myocardial ischemia or infarction.    2.The left ventricular ejection fraction is 63%.    3.There is no prior study available.    4.Some basal anterior defect is seen in a small area on the stress images and this is presumed to be breast attenuation.     MRI/MRA- DONE 2011; ELSEWHERE

## 2021-08-21 ENCOUNTER — HEALTH MAINTENANCE LETTER (OUTPATIENT)
Age: 81
End: 2021-08-21

## 2021-10-16 ENCOUNTER — HEALTH MAINTENANCE LETTER (OUTPATIENT)
Age: 81
End: 2021-10-16

## 2022-10-01 ENCOUNTER — HEALTH MAINTENANCE LETTER (OUTPATIENT)
Age: 82
End: 2022-10-01

## 2023-10-15 ENCOUNTER — HEALTH MAINTENANCE LETTER (OUTPATIENT)
Age: 83
End: 2023-10-15
